# Patient Record
Sex: MALE | Race: BLACK OR AFRICAN AMERICAN
[De-identification: names, ages, dates, MRNs, and addresses within clinical notes are randomized per-mention and may not be internally consistent; named-entity substitution may affect disease eponyms.]

---

## 2017-01-03 ENCOUNTER — HOSPITAL ENCOUNTER (OUTPATIENT)
Dept: HOSPITAL 35 - PAIN | Age: 72
Discharge: HOME | End: 2017-01-03
Attending: ANESTHESIOLOGY
Payer: COMMERCIAL

## 2017-01-03 VITALS — SYSTOLIC BLOOD PRESSURE: 186 MMHG | DIASTOLIC BLOOD PRESSURE: 87 MMHG

## 2017-01-03 VITALS — HEIGHT: 72.99 IN | BODY MASS INDEX: 32.71 KG/M2 | WEIGHT: 246.8 LBS

## 2017-01-03 DIAGNOSIS — G89.29: ICD-10-CM

## 2017-01-03 DIAGNOSIS — Z87.891: ICD-10-CM

## 2017-01-03 DIAGNOSIS — M51.16: Primary | ICD-10-CM

## 2017-01-03 DIAGNOSIS — M47.27: ICD-10-CM

## 2017-10-24 ENCOUNTER — HOSPITAL ENCOUNTER (OUTPATIENT)
Dept: HOSPITAL 35 - PAIN | Age: 72
Discharge: HOME | End: 2017-10-24
Attending: ANESTHESIOLOGY
Payer: COMMERCIAL

## 2017-10-24 VITALS — SYSTOLIC BLOOD PRESSURE: 144 MMHG | DIASTOLIC BLOOD PRESSURE: 91 MMHG

## 2017-10-24 VITALS — WEIGHT: 244.4 LBS | HEIGHT: 72.99 IN | BODY MASS INDEX: 32.39 KG/M2

## 2017-10-24 DIAGNOSIS — G89.29: ICD-10-CM

## 2017-10-24 DIAGNOSIS — M51.16: Primary | ICD-10-CM

## 2017-10-24 DIAGNOSIS — M47.27: ICD-10-CM

## 2017-10-24 DIAGNOSIS — Z87.891: ICD-10-CM

## 2017-10-24 DIAGNOSIS — Z79.899: ICD-10-CM

## 2017-10-24 DIAGNOSIS — Z79.82: ICD-10-CM

## 2019-03-19 ENCOUNTER — HOSPITAL ENCOUNTER (OUTPATIENT)
Dept: HOSPITAL 35 - PAIN | Age: 74
Discharge: HOME | End: 2019-03-19
Attending: ANESTHESIOLOGY
Payer: COMMERCIAL

## 2019-03-19 VITALS — HEIGHT: 72.99 IN | BODY MASS INDEX: 32.47 KG/M2 | WEIGHT: 245 LBS

## 2019-03-19 VITALS — DIASTOLIC BLOOD PRESSURE: 86 MMHG | SYSTOLIC BLOOD PRESSURE: 164 MMHG

## 2019-03-19 DIAGNOSIS — Z98.890: ICD-10-CM

## 2019-03-19 DIAGNOSIS — M47.27: ICD-10-CM

## 2019-03-19 DIAGNOSIS — Z79.82: ICD-10-CM

## 2019-03-19 DIAGNOSIS — Z79.899: ICD-10-CM

## 2019-03-19 DIAGNOSIS — Z79.891: ICD-10-CM

## 2019-03-19 DIAGNOSIS — M51.16: Primary | ICD-10-CM

## 2019-03-19 DIAGNOSIS — G89.29: ICD-10-CM

## 2019-03-19 DIAGNOSIS — I10: ICD-10-CM

## 2019-03-19 DIAGNOSIS — M19.90: ICD-10-CM

## 2019-03-19 DIAGNOSIS — Z87.891: ICD-10-CM

## 2019-03-19 NOTE — NUR
Pain Clinic Assessment:
 
1. History of Osteoarthritis:
Not Applicable
   History of Rheumatoid Arthritis:
Not Applicable
 
2. Height: 6 ft. 1 in. 185.4 cm.
   Weight: 245.0 lb.  oz. 111.132 kg.
   Patient's BMI: 32.3
 
3. Vital Signs:
   BP: 164/86 Pulse: 55 Resp: 16
   Temp:  02 Sat: 98 ECG Mon:
 
4. Pain Intensity: 7
 
5. Fall Risk:
   Dizziness: N  Needs help standing or walking: N
   Fallen in the last 3 months: N
   Fall risk comments:
 
 
6. Patient on Blood Thinner: None
 
7. History of Hypertension: Y
 
8. Opioid Therapy greater than 6 weeks: Y
   Opiate Contract Signed:
 
9. Risk Assessment Tool Provided: 0-LOW
 
10. Functional Assessment Tool: 13/70
 
11. Recreational Drug Use: Never Drug Type:
    Tobacco Use: Former Smoker Tobacco Type:
       Amount or Packs/day:  How Many Years:
    Alcohol Use: No  Frequency:  Quant:

## 2020-03-24 ENCOUNTER — HOSPITAL ENCOUNTER (OUTPATIENT)
Dept: HOSPITAL 35 - PAIN | Age: 75
Discharge: HOME | End: 2020-03-24
Attending: ANESTHESIOLOGY
Payer: COMMERCIAL

## 2020-03-24 VITALS — DIASTOLIC BLOOD PRESSURE: 79 MMHG | SYSTOLIC BLOOD PRESSURE: 163 MMHG

## 2020-03-24 VITALS — BODY MASS INDEX: 32.42 KG/M2 | HEIGHT: 72.99 IN | WEIGHT: 244.6 LBS

## 2020-03-24 DIAGNOSIS — M17.12: ICD-10-CM

## 2020-03-24 DIAGNOSIS — M19.90: ICD-10-CM

## 2020-03-24 DIAGNOSIS — M51.16: Primary | ICD-10-CM

## 2020-03-24 DIAGNOSIS — Z79.899: ICD-10-CM

## 2020-03-24 DIAGNOSIS — Z79.82: ICD-10-CM

## 2020-03-24 DIAGNOSIS — G89.29: ICD-10-CM

## 2020-03-24 DIAGNOSIS — M47.27: ICD-10-CM

## 2020-03-24 DIAGNOSIS — Z79.891: ICD-10-CM

## 2020-03-24 DIAGNOSIS — Z98.890: ICD-10-CM

## 2020-03-24 DIAGNOSIS — I10: ICD-10-CM

## 2020-03-24 NOTE — NUR
Pain Clinic Assessment:
 
1. History of Osteoarthritis:
Not Applicable
   History of Rheumatoid Arthritis:
Not Applicable
 
2. Height: 6 ft. 1 in. 185.4 cm.
   Weight: 244.6 lb.  oz. 110.950 kg.
   Patient's BMI: 32.3
 
3. Vital Signs:
   BP: 163/79 Pulse: 66 Resp: 14
   Temp:  02 Sat: 100 ECG Mon:
 
4. Pain Intensity: 4-5
 
5. Fall Risk:
   Dizziness: N  Needs help standing or walking: N
   Fallen in the last 3 months: N
   Fall risk comments:
 
 
6. Patient on Blood Thinner: None
 
7. History of Hypertension: Y
 
8. Opioid Therapy greater than 6 weeks: Y
   Opiate Contract Signed:
 
9. Risk Assessment Tool Provided: 0-LOW
 
10. Functional Assessment Tool: 5/70
 
11. Recreational Drug Use: Never Drug Type:
    Tobacco Use: Former Smoker Tobacco Type:
       Amount or Packs/day:  How Many Years:
    Alcohol Use: No  Frequency:  Quant:

## 2020-03-31 NOTE — HPC
[facility]
[place]   [address]                     PAIN MANAGEMENT CONSULTATION  
_______________________________________________________________________________
 
Name:       YAJAIRA COOK                 Room #:                     REG REINA 
M.R.#:      [id number]                       Account #:      [id number]  
Admission:  03/24/20    Attend Phys:    Roscoe Rodríguez DO  
Discharge:              Date of Birth:  [birth year]  
                                                          Report #: [id number]
                                                                    [id number]   
_______________________________________________________________________________
THIS REPORT FOR:  
 
cc:  Ken Fall MD, David A. MD Johnson, James E. DO                                          ~
 
 
DATE OF SERVICE:  03/24/2020
 
 
CHIEF COMPLAINT:  Low back pain, bilateral lower extremity pain, left greater
than right, chronic left knee pain.
 
HISTORY OF PRESENT ILLNESS:  As you know, the patient is a very pleasant
74-year-old male who returns today in followup visit to undergo next in the
series of lumbar epidural injections under fluoroscopic guidance.  The patient
reports previous epidural injection gave 75% improvement in overall pain.  This
was provided on 03/19/2019.  He returns today in followup visit to undergo next
in the series of lumbar epidural injections to address recurrent lumbar
radicular symptoms, rating pain at 4-5/10.  The patient denies injury or trauma
that may have led to symptom development.  He is also complaining of chronic
left knee pain due to osteoarthritis and wishes further workup in regards to
this issue.  He denies injury or trauma to this left knee as well.
 
ALLERGIES:  No known drug allergies.
 
CURRENT MEDICATIONS:  Metoprolol 50 mg once a day, amlodipine 10 mg once a day,
hydrocodone 5/325 one tab every 8 hours p.r.n. for pain, pioglitazone 30 mg once
a day, aspirin 81 mg per day, lisinopril/hydrochlorothiazide 20/25 mg once a
day.
 
SOCIAL HISTORY:  The patient denies tobacco, alcohol, IV or illicit drug use. 
He is a reformed smoker.  He is unaccompanied today.
 
IMAGING:  No new imaging available.
 
PQRS:  The patient has known arthritic changes of the lumbar spine, bilateral
hips and bilateral knees, left greater than right.  No rheumatoid arthritis.  He
is placing pain today at 4-5/10.  Not a fall risk, has not had a fall in last 3
months.  He is not on blood thinners, but is treated for hypertension.  He is on
chronic opioids and has a low opioid addiction potential based on our assessment
tool.  Pain impact score 5/70 indicating mild interference of daily activities
secondary to pain.
 
PHYSICAL EXAMINATION:
VITAL SIGNS:  Blood pressure 163/79, pulse 66, respiratory rate 14 and
 
 
 
[facility]
[address] [place]   [address]                     PAIN MANAGEMENT CONSULTATION  
_______________________________________________________________________________
 
Name:       YAJAIRA COOK                 [facility] #[facility]                     [facility]      [id number]                       Account #:      [id number]  
Admission:  03/24/20    Attend Phys:    Roscoe Rodríguez DO  
Discharge:              Date of Birth:  [birth year]  
                                                          Report #: [id number]
                                                                    [id number]   
_______________________________________________________________________________
unlabored.  The patient is 100% on room air.  Height 6 feet 1 inch tall, weight
244.6 pounds, BMI calculated 32.3.
GENERAL:  Well-developed, well-nourished, well-hydrated 74-year-old male
appearing his stated age, pain is rated anywhere from 4-5/10.
HEENT:  Normocephalic, atraumatic.  Pupils equal, round, and reactive to light. 
Extraocular muscles are intact.
EXTREMITIES:  Show no clubbing, no cyanosis, and no edema.
MUSCULOSKELETAL:  There is palpatory tenderness noted over the paraspinal
musculature of lower lumbar spine.  No spinous process tenderness.  Seated
straight leg raising negative.  Supine straight leg raising positive on the
left.  Active and passive range of motion of the left knee causes
intensification of pain.  Lateral collateral and medial collateral ligaments are
intact.  Drawer tests are negative, both anterior and posterior.
 
ASSESSMENT:
1.  Symptomatic lumbar radiculopathy.
2.  Displacement of lumbar intervertebral disk with radiculopathy.
3.  Lumbosacral spondylosis with radiculopathy.
4.  Lumbar degeneration.
5.  Osteoarthritis of the left knee.
6.  Chronic intractable pain.
 
PLAN:
1.  Based on today's physical exam and history the patient has provided, the
description the patient uses in regards to pain as well as location of symptoms,
likely source of the patient's pain is lumbar radiculopathy.  The patient does
have a pain level today rated at 4-5/10 consistent with his lumbar radicular
symptoms involving mainly the left lower extremity.  He has been advised of the
risks and the benefits of repeated epidural injection.  These risks include but
are not necessarily limited to bleeding, bruising, infection, worsening pain, no
relief of pain, also risk of temporary or permanent muscle weakness, temporary
or permanent nerve damage, possible paralysis and death.  The patient states
understood and wished to proceed.
2.  The patient does appear to have increasing arthritic changes of the left
knee.  Active and passive range of motion does increase overall knee pain,
though drawer tests are negative as is laxity testing for the medial collateral
and lateral collateral ligaments.  Given the patient's age, I believe his
symptoms are related more osteoarthritis.  I was unable to elicit any concerning
findings for a meniscal injury.  We recommend the patient to undergo x-ray
imaging of the left knee to further evaluate.  There is a possibility we can
discuss interventional treatments with patient if necessary.  He is amenable to
undergo the x-ray today.  The patient was given an x-ray request for AP and
lateral imaging to further evaluate this left knee.  If findings are
significant, I may provide a referral to Orthopedics.
3.  We will start the patient on meloxicam to address his ongoing left knee
osteoarthritic pain as well as his other pain generators due to osteoarthritis
 
 
 
[facility]
[address]
[place]   [address]                     PAIN MANAGEMENT CONSULTATION  
_______________________________________________________________________________
 
Name:       YAJAIRA COOK                 Room #:                     REG [facility].#:      [id number]                       Account #:      [id number]  
Admission:  03/24/20    Attend Phys:    Roscoe Rodríguez DO  
Discharge:              Date of Birth:  [birth year]  
                                                          Report #: [id number]
                                                                    [id number]   
_______________________________________________________________________________
including the right knee and bilateral hips.  I have started the patient on 7.5
mg dose of meloxicam to be taken in the morning.  He can then follow up with a
second dose in the evening with meal.  He was given #60 tablets and 2 refills, 3
months' worth of medication.  The patient will make sure he contact his PCP
before initiating the therapy as there is a potential concern of longstanding
nonsteroidal anti-inflammatory treatment and renal effects.  Prescription was
sent via e-script to local pharmacy.
4.  We will see the patient back in followup visit on an as needed basis for
possible next in the series of epidural injections.  He can contact our clinic
tomorrow, 03/25/2020 for the findings of his x-ray imaging of the left knee.
 
PROCEDURE NOTE:
 
DESCRIPTION OF PROCEDURE:  L5-S1 left paramedian epidural steroid injection
under fluoroscopic guidance.
 
After obtaining written consent, the patient was taken back to fluoroscopy
suite, placed in prone position with pillow under abdomen to decrease lumbar
lordosis.  Skin overlying lumbosacral area then prepped and draped in aseptic
fashion.  The L5-S1 vertebral interspace identified by AP fluoroscopy.  Skin and
subcutaneous tissue overlying target site injection anesthetized with 3 mL of 1%
lidocaine.
 
A 20-gauge 3-1/2 inch Tuohy needle advanced under fluoroscopic guidance towards
the epidural space using left paramedian approach.  The epidural space
identified using loss of resistance to air technique.  After negative aspiration
for heme or cerebrospinal fluid, 1 mL of Omnipaque injected.  Lumbar epidurogram
confirmed using both AP and oblique fluoroscopy.  After negative aspiration for
heme or cerebrospinal fluid, 5 mL of a solution containing 2 mL 40 mg per mL, 80
mg total of triamcinolone along with 3 mL of lidocaine 1% injected slowly. 
Needle retracted MCC, flushed with 1 mL of 1% lidocaine and then removed. 
Sterile bandage placed over injection site.  No new motor deficits present in
lower extremities following procedure.
 
The patient tolerated procedure well, carefully escorted to recovery room in
stable condition.  No apparent complications.  After meeting discharge criteria,
the patient discharged home.
 
 
 
 
 
 
 
  <ELECTRONICALLY SIGNED>
   By: Roscoe Rodríguez DO          
  03/31/20     0915
D: 03/24/20 1021                           _____________________________________
T: 03/24/20 1059                           Roscoe Rodríguez DO            /nt

## 2022-01-04 ENCOUNTER — HOSPITAL ENCOUNTER (OUTPATIENT)
Dept: HOSPITAL 35 - PAIN | Age: 77
Discharge: HOME | End: 2022-01-04
Attending: ANESTHESIOLOGY
Payer: COMMERCIAL

## 2022-01-04 VITALS — WEIGHT: 242 LBS | BODY MASS INDEX: 32.07 KG/M2 | HEIGHT: 72.99 IN

## 2022-01-04 VITALS — DIASTOLIC BLOOD PRESSURE: 90 MMHG | SYSTOLIC BLOOD PRESSURE: 162 MMHG

## 2022-01-04 DIAGNOSIS — M17.12: ICD-10-CM

## 2022-01-04 DIAGNOSIS — Z79.899: ICD-10-CM

## 2022-01-04 DIAGNOSIS — Z98.890: ICD-10-CM

## 2022-01-04 DIAGNOSIS — M51.16: Primary | ICD-10-CM

## 2022-01-04 DIAGNOSIS — G89.29: ICD-10-CM

## 2022-01-04 DIAGNOSIS — I10: ICD-10-CM

## 2022-01-04 DIAGNOSIS — Z87.891: ICD-10-CM

## 2022-01-04 DIAGNOSIS — M19.90: ICD-10-CM

## 2022-01-04 DIAGNOSIS — M47.27: ICD-10-CM

## 2022-01-04 NOTE — HPC
[facility]
[place]   [address]                     PAIN MANAGEMENT CONSULTATION  
_______________________________________________________________________________
 
Name:       YAJAIRA COOK                 Room #:                     REG [facility].#:      [id number]                       Account #:      [id number]  
Admission:  01/04/22    Attend Phys:    Roscoe Rodríguez DO  
Discharge:              Date of Birth:  [birth year]  
                                                          Report #: [id number]
                                                                    [id number] 
_______________________________________________________________________________
THIS REPORT FOR:  
 
cc:  Ken Fall MD, David A. MD Johnson, James E. DO                                          ~
 
cc:     Ken Fall MD
DATE OF SERVICE: 01/04/2022
 
REFERRING PHYSICIAN:  Ken Fall MD
 
CHIEF COMPLAINT:  Low back pain, left lower extremity pain with paresthesias.
 
HISTORY OF PRESENT ILLNESS:  As you know, the patient is a very pleasant 
76-year-old male, returning in followup visit with pain score of about 6/10 upon
arising in the morning hours.  He states that previous epidural injection 
provided in 03/2020 gave 75% improvement in overall pain until just recently.  
He has had a slow and progressive return of symptoms.  The patient denies injury
or trauma that may have led to symptom development.  He continues to experience 
left knee pain for which he has been advised by his primary care physician to 
look towards orthopedic surgical options as he has severe changes, unresponsive 
to injections.  He has returned today in followup visit to undergo lumbar 
epidural injection under fluoroscopic guidance to address his recurrent lumbar 
radicular symptoms involving the low back and left lower extremity.  He is very 
pleased with response to the initial injection, returning today in followup 
visit requesting the next in the series.  The patient denies new injury, new 
trauma that may have led to symptom development.  He has had no changes in his 
medication management that would preclude him from undergoing the requested 
lumbar epidural injection today.
 
ALLERGIES:  No known drug allergies.
 
CURRENT MEDICATIONS:  Fenofibrate, meloxicam, metoprolol, amlodipine, 
hydrocodone, pioglitazone, aspirin, lisinopril, and hydrochlorothiazide.
 
SOCIAL HISTORY:  The patient denies tobacco, alcohol or IV or illicit drug use. 
He is unaccompanied today.
 
IMAGING:  No new imaging available.
 
PQRS:  The patient has known arthritic changes of the lumbar spine and left 
knee.  No rheumatoid arthritis.  He is placing pain intensity anywhere from 
0-6/10 depending on activity.  He is not at fall risk, has not had a fall in 
last 3 months.  He is not on blood thinners, but is treated for hypertension.  
He is on chronic opioids, has a low opioid addiction potential based on 
assessment tool.  Pain impact is 5/70, mild interference of daily activities 
 
 
 
[facility]
[address] [place]   [address]                     PAIN MANAGEMENT CONSULTATION  
_______________________________________________________________________________
 
Name:       YAJAIRA COOK                 Room #:                     [facility]      [id number]                       Account #:      [id number]  
Admission:  01/04/22    Attend Phys:    Roscoe Rodríguez DO  
Discharge:              Date of Birth:  [birth year]  
                                                          Report #: [id number]
                                                                    [id number] 
_______________________________________________________________________________
secondary to pain.
 
PHYSICAL EXAMINATION:
VITAL SIGNS:  Blood pressure 162/90, pulse is 78, respiratory rate 14 and 
unlabored.  The patient 100% on room air. Height 6 feet 1 inch tall, weight 242 
pounds, BMI calculated 31.9.
GENERAL:  Well-developed, well-nourished, well-hydrated 76-year-old male 
appearing stated age, pain is rated anywhere from 0-6/10 depending on activity.
HEENT:  Normocephalic, atraumatic.  The patient is wearing a mask in compliance 
with COVID-19 regulations and hospital policies.
EXTREMITIES:  Show no clubbing, no cyanosis, no edema.
MUSCULOSKELETAL:  Lower extremity strength appears symmetrical again today 5/5. 
Muscle bulk and tone is symmetrical in comparing lower extremities.  Seated 
straight leg raising negative.  Supine straight leg raising positive on the 
left.  Active and passive range of motion of the right knee provides no pain.  
Left knee shows increase in pain intensity.  There is no crepitus with movement.
 Lateral collateral ligaments are intact bilaterally.  Drawer tests are negative
bilaterally.
 
ASSESSMENT:
1.  Symptomatic lumbar radiculopathy.
2.  Displacement of lumbar intervertebral disk with radiculopathy.
3.  Lumbosacral spondylosis with radiculopathy.
4.  Lumbar degeneration.
5.  Osteoarthritis of the left knee.
6.  Chronic intractable pain.
 
PLAN:
1.  The patient returns today in followup visit, requesting a lumbar epidural 
injection under fluoroscopic guidance.  He reports excellent improvement with 
this injection that was provided at our visit of 03/24/2020.  In fact, he has 
been doing very well until just recently where he has had a slow and progressive
return of symptoms, reporting up to 75% improvement in overall pain.  He returns
requesting a lumbar epidural injection under fluoroscopic guidance to address 
recurrent lumbar radicular pain.  The patient has been advised risks and 
benefits of the procedure, states understood and wished to proceed.
2.  The patient had a long discussion today about his ongoing left knee pain.  
We have done imaging of his left knee, which shows changes that will likely 
require surgical options.  The patient is resistant to consider this option at 
this point.  He has undergone injections by his primary care physician to 
address knee pain, but this was met with only transient pain improvement.  He 
has been advised by his PCP to look towards orthopedic options as well.  I have 
offered to the patient the opportunity to be referred to see Orthopedic Surgery.
 He will consider this option.  I will keep this option available to him.  If he
wishes a referral, I would be more than willing to provide this.
3.  No medication changes made at today's visit.  The patient will continue 
 
 
 
[facility]
[address] [place]   [address]                     PAIN MANAGEMENT CONSULTATION  
_______________________________________________________________________________
 
Name:       YAJAIRA COOK                 [facility] #[facility]                     [facility]      [id number]                       Account #:      [id number]  
Admission:  01/04/22    Attend Phys:    Roscoe Rodríguez DO  
Discharge:              Date of Birth:  [birth year]  
                                                          Report #: [id number]
                                                                    [id number] 
_______________________________________________________________________________
current medical therapy as prior prescribed.
4.  Plan to see the patient back in followup visit on an as needed basis for the
next in the series of lumbar epidural injections.  I am hopeful the patient will
once again see good and prolonged benefit with the procedure provided today.
 
PROCEDURE NOTE.
 
DESCRIPTION OF PROCEDURE:  L5-S1 left paramedian epidural steroid injection 
under fluoroscopic guidance.
 
After obtaining written consent, the patient was taken back to fluoroscopy 
suite, placed in prone position with pillow under abdomen to decrease lumbar 
lordosis.  Skin overlying the lumbosacral area prepped and draped in aseptic 
fashion.  L5-S1 vertebral interspace was identified by AP fluoroscopy.  Skin and
subcutaneous tissue overlying target site injection anesthetized with 3 mL of 1%
lidocaine.
 
A 20 gauge 3-1/2 inch Tuohy needle advanced under fluoroscopic guidance towards 
the epidural space using a left paramedian approach.  Epidural space was 
identified using loss of resistance to air technique.  After negative aspiration
for heme or cerebrospinal fluid, 1 mL of Omnipaque injected.  Lumbar epidurogram
confirmed using both AP and lateral fluoroscopy.  After negative aspiration for 
heme or cerebrospinal fluid, 5 mL of solution containing 2 mL of 40 mg per mL, 
80 mg total triamcinolone along with 3 mL of lidocaine 1% injected slowly.  
Needle retracted senior living flushed with 1 mL of 1% lidocaine and removed.  Sterile
bandage placed over injection site.  No new motor deficits present in lower 
extremity following procedure.
 
The patient tolerated the procedure well, carefully escorted to recovery room in
stable condition.  No apparent complications.  After meeting discharge criteria,
the patient discharged home.
 
 
 
 
 
 
 
 
 
 
 
 
 
  <ELECTRONICALLY SIGNED>
   By: Roscoe Rodríguez DO          
  01/04/22     1346
D: 01/04/22 0921                           _____________________________________
T: 01/04/22 1207                           Roscoe Rodríguez DO            /nt

## 2022-01-04 NOTE — NUR
Pain Clinic Assessment:
 
1. History of Osteoarthritis:
SPINE AND LEFT KNEE
   History of Rheumatoid Arthritis:
Not Applicable
 
2. Height: 6 ft. 1 in. 185.4 cm.
   Weight: 242.0 lb.  oz. 109.771 kg.
   Patient's BMI: 31.9
 
3. Vital Signs:
   BP: 162/90 Pulse: 78 Resp: 14
   Temp:  02 Sat: 100 ECG Mon:
 
4. Pain Intensity: 0 TO 6 (IN am)
 
5. Fall Risk:
   Dizziness: N  Needs help standing or walking: N
   Fallen in the last 3 months: N
   Fall risk comments:
 
 
6. Patient on Blood Thinner: None
 
7. History of Hypertension: Y
 
8. Opioid Therapy greater than 6 weeks: Y
   Opiate Contract Signed:
 
9. Risk Assessment Tool Provided: 0-LOW
 
10. Functional Assessment Tool: 5/70
 
11. Recreational Drug Use: Never Drug Type:
    Tobacco Use: Former Smoker Tobacco Type:
       Amount or Packs/day:  How Many Years:
    Alcohol Use: No  Frequency:  Quant: